# Patient Record
Sex: FEMALE | Race: WHITE | NOT HISPANIC OR LATINO | Employment: FULL TIME | URBAN - METROPOLITAN AREA
[De-identification: names, ages, dates, MRNs, and addresses within clinical notes are randomized per-mention and may not be internally consistent; named-entity substitution may affect disease eponyms.]

---

## 2023-10-25 ENCOUNTER — OFFICE VISIT (OUTPATIENT)
Dept: OBGYN CLINIC | Facility: CLINIC | Age: 33
End: 2023-10-25
Payer: COMMERCIAL

## 2023-10-25 VITALS
TEMPERATURE: 98.8 F | BODY MASS INDEX: 32.85 KG/M2 | SYSTOLIC BLOOD PRESSURE: 107 MMHG | HEART RATE: 96 BPM | HEIGHT: 64 IN | DIASTOLIC BLOOD PRESSURE: 74 MMHG | WEIGHT: 192.4 LBS

## 2023-10-25 DIAGNOSIS — M54.12 CERVICAL RADICULOPATHY: ICD-10-CM

## 2023-10-25 DIAGNOSIS — M54.2 NECK PAIN: Primary | ICD-10-CM

## 2023-10-25 PROCEDURE — 99204 OFFICE O/P NEW MOD 45 MIN: CPT | Performed by: FAMILY MEDICINE

## 2023-10-25 RX ORDER — CYCLOBENZAPRINE HCL 10 MG
10 TABLET ORAL 3 TIMES DAILY PRN
COMMUNITY

## 2023-10-25 RX ORDER — NAPROXEN 500 MG/1
500 TABLET ORAL 2 TIMES DAILY WITH MEALS
COMMUNITY

## 2023-10-25 RX ORDER — METHYLPREDNISOLONE 4 MG/1
4 TABLET ORAL 2 TIMES DAILY
COMMUNITY

## 2023-10-25 NOTE — PROGRESS NOTES
[facility] - [facility] [address] [address] [phone]      Assessment:  1. Neck pain  -     Ambulatory Referral to Physical Therapy; Future    2. Cervical radiculopathy  -     Ambulatory Referral to Physical Therapy; Future        Plan:  Patient Instructions   F/u 1 wk  Begin physical therapy  Icing/heat/OTC pain meds as needed. Flexeril as needed  Avoid ibupofen, aleve, advil while taking steroids. Tylenol as needed  Finish steroids       Return in about 1 week (around 11/1/2023) for Recheck. Chief Complaint:  Chief Complaint   Patient presents with    Neck - Pain    Right Shoulder - Pain       Subjective:   HPI    Patient ID: Maribel Raygoza is a 26 y.o. female   Here c/o neck/R shoulder pain  Pain started 5 days ago- woke up with neck pain  [place] hot- has become worse  Radiatiing down R arm to R hand/fingers  Very painful  Seen in [facility]- given flexeril. naproxen/medrol dose pack  Medrol helped drastically  Saw chiropractor. Denies trauma/injury  Hx of neck pain in the past  N/t- 1-3 fingers. Achey pain  Worse extending neck  Better with reaching behind head. Review of Systems   Constitutional:  Negative for fatigue and fever. Respiratory:  Negative for shortness of breath. Cardiovascular:  Negative for chest pain. Gastrointestinal:  Negative for abdominal pain and nausea. Genitourinary:  Negative for dysuria. Musculoskeletal:  Positive for neck pain. Skin:  Negative for rash and wound. Neurological:  Negative for weakness and headaches.        Objective:  Vitals:  /74 (BP Location: Left arm, Patient Position: Sitting, Cuff Size: Standard)   Pulse 96   Temp 98.8 °F (37.1 °C) (Tympanic)   Ht 5' 4" (1.626 m)   Wt 87.3 kg (192 lb 6.4 oz)   BMI 33.03 kg/m²     The following portions of the patient's history were reviewed and updated as appropriate: allergies, current medications, past family history, past medical history, past social history, past surgical history, and problem list.    Physical exam:  Physical Exam  Constitutional:       Appearance: Normal appearance. She is normal weight. HENT:      Head: Normocephalic. Eyes:      Extraocular Movements: Extraocular movements intact. Pulmonary:      Effort: Pulmonary effort is normal.   Musculoskeletal:      Cervical back: Normal range of motion. Skin:     General: Skin is warm and dry. Neurological:      General: No focal deficit present. Mental Status: She is alert and oriented to person, place, and time. Mental status is at baseline. Psychiatric:         Mood and Affect: Mood normal.         Behavior: Behavior normal.         Thought Content: Thought content normal.         Judgment: Judgment normal.       Back Exam     Tenderness   The patient is experiencing no tenderness.      Comments:  Pain with ext/lat flex L  Motor 5/5  Pos spurling R  Dec sensation                  Franky Nguyen MD

## 2023-10-25 NOTE — PATIENT INSTRUCTIONS
F/u 1 wk  Begin physical therapy  Icing/heat/OTC pain meds as needed. Flexeril as needed  Avoid ibupofen, aleve, advil while taking steroids.     Tylenol as needed  Finish steroids

## 2023-10-25 NOTE — LETTER
October 25, 2023     Patient: Anna Rojas  YOB: 1990  Date of Visit: 10/25/2023      To Whom it May Concern:    Anna Rojas is under my professional care. Dylan was seen in my office on 10/25/2023. Dylan is excused from work due to the medical condition I am treating her for from 10/25/23-11/1/23. If you have any questions or concerns, please don't hesitate to call.          Sincerely,          Stephanie Leal MD        CC: No Recipients

## 2023-10-27 ENCOUNTER — TELEPHONE (OUTPATIENT)
Dept: OBGYN CLINIC | Facility: HOSPITAL | Age: 33
End: 2023-10-27

## 2023-10-27 NOTE — TELEPHONE ENCOUNTER
Caller: Patient    Doctor/Office: Dr. Suki Aguila    #: [phone]    Work or school note: Work    Return to work/school date: Work is asking that the letter be revised stating patient can return to work on Monday 11/30/2023, with no restrictions. Please call patient once this has been completed. Fax #: [phone]- Att: James     Is there any restrictions that need to be updated? If so, what?  N/A